# Patient Record
Sex: FEMALE | Race: WHITE | URBAN - METROPOLITAN AREA
[De-identification: names, ages, dates, MRNs, and addresses within clinical notes are randomized per-mention and may not be internally consistent; named-entity substitution may affect disease eponyms.]

---

## 2023-03-02 ENCOUNTER — INPATIENT (INPATIENT)
Facility: HOSPITAL | Age: 23
LOS: 2 days | Discharge: HOME | DRG: 832 | End: 2023-03-05
Attending: PEDIATRICS | Admitting: PEDIATRICS
Payer: COMMERCIAL

## 2023-03-02 ENCOUNTER — APPOINTMENT (OUTPATIENT)
Dept: VASCULAR SURGERY | Facility: CLINIC | Age: 23
End: 2023-03-02
Payer: MEDICAID

## 2023-03-02 VITALS
HEART RATE: 100 BPM | DIASTOLIC BLOOD PRESSURE: 85 MMHG | SYSTOLIC BLOOD PRESSURE: 120 MMHG | OXYGEN SATURATION: 98 % | WEIGHT: 199.96 LBS | RESPIRATION RATE: 18 BRPM | TEMPERATURE: 98 F

## 2023-03-02 DIAGNOSIS — L03.116 CELLULITIS OF LEFT LOWER LIMB: ICD-10-CM

## 2023-03-02 DIAGNOSIS — M79.89 OTHER SPECIFIED SOFT TISSUE DISORDERS: ICD-10-CM

## 2023-03-02 PROBLEM — Z00.00 ENCOUNTER FOR PREVENTIVE HEALTH EXAMINATION: Status: ACTIVE | Noted: 2023-03-02

## 2023-03-02 LAB
ANION GAP SERPL CALC-SCNC: 11 MMOL/L — SIGNIFICANT CHANGE UP (ref 5–17)
BASOPHILS # BLD AUTO: 0.04 K/UL — SIGNIFICANT CHANGE UP (ref 0–0.2)
BASOPHILS NFR BLD AUTO: 0.2 % — SIGNIFICANT CHANGE UP (ref 0–2)
BLD GP AB SCN SERPL QL: NEGATIVE — SIGNIFICANT CHANGE UP
BUN SERPL-MCNC: 4 MG/DL — LOW (ref 7–23)
CALCIUM SERPL-MCNC: 9 MG/DL — SIGNIFICANT CHANGE UP (ref 8.4–10.5)
CHLORIDE SERPL-SCNC: 100 MMOL/L — SIGNIFICANT CHANGE UP (ref 96–108)
CO2 SERPL-SCNC: 24 MMOL/L — SIGNIFICANT CHANGE UP (ref 22–31)
CREAT SERPL-MCNC: 0.44 MG/DL — LOW (ref 0.5–1.3)
EGFR: 140 ML/MIN/1.73M2 — SIGNIFICANT CHANGE UP
EOSINOPHIL # BLD AUTO: 0.12 K/UL — SIGNIFICANT CHANGE UP (ref 0–0.5)
EOSINOPHIL NFR BLD AUTO: 0.7 % — SIGNIFICANT CHANGE UP (ref 0–6)
GLUCOSE SERPL-MCNC: 137 MG/DL — HIGH (ref 70–99)
HCT VFR BLD CALC: 34.7 % — SIGNIFICANT CHANGE UP (ref 34.5–45)
HGB BLD-MCNC: 11.1 G/DL — LOW (ref 11.5–15.5)
IMM GRANULOCYTES NFR BLD AUTO: 0.8 % — SIGNIFICANT CHANGE UP (ref 0–0.9)
LYMPHOCYTES # BLD AUTO: 14.1 % — SIGNIFICANT CHANGE UP (ref 13–44)
LYMPHOCYTES # BLD AUTO: 2.54 K/UL — SIGNIFICANT CHANGE UP (ref 1–3.3)
MCHC RBC-ENTMCNC: 25.2 PG — LOW (ref 27–34)
MCHC RBC-ENTMCNC: 32 GM/DL — SIGNIFICANT CHANGE UP (ref 32–36)
MCV RBC AUTO: 78.9 FL — LOW (ref 80–100)
MONOCYTES # BLD AUTO: 0.64 K/UL — SIGNIFICANT CHANGE UP (ref 0–0.9)
MONOCYTES NFR BLD AUTO: 3.5 % — SIGNIFICANT CHANGE UP (ref 2–14)
NEUTROPHILS # BLD AUTO: 14.55 K/UL — HIGH (ref 1.8–7.4)
NEUTROPHILS NFR BLD AUTO: 80.7 % — HIGH (ref 43–77)
NRBC # BLD: 0 /100 WBCS — SIGNIFICANT CHANGE UP (ref 0–0)
PLATELET # BLD AUTO: 236 K/UL — SIGNIFICANT CHANGE UP (ref 150–400)
POTASSIUM SERPL-MCNC: 3.5 MMOL/L — SIGNIFICANT CHANGE UP (ref 3.5–5.3)
POTASSIUM SERPL-SCNC: 3.5 MMOL/L — SIGNIFICANT CHANGE UP (ref 3.5–5.3)
RBC # BLD: 4.4 M/UL — SIGNIFICANT CHANGE UP (ref 3.8–5.2)
RBC # FLD: 13.4 % — SIGNIFICANT CHANGE UP (ref 10.3–14.5)
RH IG SCN BLD-IMP: POSITIVE — SIGNIFICANT CHANGE UP
RH IG SCN BLD-IMP: POSITIVE — SIGNIFICANT CHANGE UP
SARS-COV-2 RNA SPEC QL NAA+PROBE: NEGATIVE — SIGNIFICANT CHANGE UP
SODIUM SERPL-SCNC: 135 MMOL/L — SIGNIFICANT CHANGE UP (ref 135–145)
WBC # BLD: 18.04 K/UL — HIGH (ref 3.8–10.5)
WBC # FLD AUTO: 18.04 K/UL — HIGH (ref 3.8–10.5)

## 2023-03-02 PROCEDURE — 93971 EXTREMITY STUDY: CPT

## 2023-03-02 PROCEDURE — 99285 EMERGENCY DEPT VISIT HI MDM: CPT

## 2023-03-02 PROCEDURE — 99204 OFFICE O/P NEW MOD 45 MIN: CPT

## 2023-03-02 PROCEDURE — 99283 EMERGENCY DEPT VISIT LOW MDM: CPT

## 2023-03-02 RX ORDER — PIPERACILLIN AND TAZOBACTAM 4; .5 G/20ML; G/20ML
3.38 INJECTION, POWDER, LYOPHILIZED, FOR SOLUTION INTRAVENOUS ONCE
Refills: 0 | Status: COMPLETED | OUTPATIENT
Start: 2023-03-02 | End: 2023-03-02

## 2023-03-02 RX ORDER — PIPERACILLIN AND TAZOBACTAM 4; .5 G/20ML; G/20ML
3.38 INJECTION, POWDER, LYOPHILIZED, FOR SOLUTION INTRAVENOUS EVERY 8 HOURS
Refills: 0 | Status: DISCONTINUED | OUTPATIENT
Start: 2023-03-03 | End: 2023-03-03

## 2023-03-02 RX ORDER — FOLIC ACID 0.8 MG
1 TABLET ORAL DAILY
Refills: 0 | Status: DISCONTINUED | OUTPATIENT
Start: 2023-03-02 | End: 2023-03-05

## 2023-03-02 RX ORDER — VANCOMYCIN HCL 1 G
1250 VIAL (EA) INTRAVENOUS EVERY 12 HOURS
Refills: 0 | Status: DISCONTINUED | OUTPATIENT
Start: 2023-03-03 | End: 2023-03-03

## 2023-03-02 RX ORDER — FERROUS SULFATE 325(65) MG
325 TABLET ORAL DAILY
Refills: 0 | Status: DISCONTINUED | OUTPATIENT
Start: 2023-03-02 | End: 2023-03-05

## 2023-03-02 RX ORDER — VANCOMYCIN HCL 1 G
1250 VIAL (EA) INTRAVENOUS ONCE
Refills: 0 | Status: COMPLETED | OUTPATIENT
Start: 2023-03-02 | End: 2023-03-02

## 2023-03-02 RX ADMIN — PIPERACILLIN AND TAZOBACTAM 200 GRAM(S): 4; .5 INJECTION, POWDER, LYOPHILIZED, FOR SOLUTION INTRAVENOUS at 20:51

## 2023-03-02 RX ADMIN — Medication 166.67 MILLIGRAM(S): at 23:17

## 2023-03-02 NOTE — ED PROVIDER NOTE - PROGRESS NOTE DETAILS
Vascular Vascular consulted w/ ID Dr Pacheco, whom is aware pt is 30 weeks pregnant, and is requesting Zosyn and Vanco OB paged OB again paged D/w OB Dr List - admit to OB Dr Weston, agrees with abx regimen Vascular consulted w/ ID Dr Pacheco, whom is aware pt is 30 weeks pregnant, and is requesting Zosyn and Vanco. vascular will follow pt.

## 2023-03-02 NOTE — ED PROVIDER NOTE - CLINICAL SUMMARY MEDICAL DECISION MAKING FREE TEXT BOX
Pt referred to the ED for admission for IV abx in the setting of 30 weeks pregnant, cellulitis, failing outpt oral abx. Labs, abx, anticipate admission

## 2023-03-02 NOTE — ED PROVIDER NOTE - TOBACCO USE
Discussed no further surgery is recommended due to multiple surgeries. Do not recommend gold weight. Unknown if ever smoked

## 2023-03-02 NOTE — ED PROVIDER NOTE - NS ED ROS FT
Constitutional: No fever or chills.   GI: No nausea, vomiting, diarrhea or abdominal pain.  : No dysuria, frequency or burning. no vaginal bleeding  Skin: See HPI  Endocrine: No history of thyroid disease or diabetes.  Except as documented in the HPI, all other systems are negative.

## 2023-03-02 NOTE — ED PROVIDER NOTE - PHYSICAL EXAMINATION
Constitutional: Well appearing, awake, alert, oriented to person, place, time/situation and in no apparent distress.  ENMT: Airway patent.   Eyes: Clear bilaterally  Cardiac: Normal rate, regular rhythm.   Respiratory: No increased WOB, tachypnea, hypoxia, or accessory mm use. Pt speaks in full sentences.   Musculoskeletal: Range of motion is not limited  Neuro: Alert and oriented x 3, face symmetric and speech fluent. Nml gross motor movement, grossly non focal   Skin: Skin normal color for race, warm, dry and intact. LLE cellulitis to the anterior lower leg, warm. peeling to toes distally - pt reports unrelated and dry skin  Vasc: 2+ pedal pulses  Psych: Alert and oriented to person, place, time/situation. normal mood and affect. no apparent risk to self or others.

## 2023-03-02 NOTE — CONSULT NOTE ADULT - ASSESSMENT
A/P: 23y.o F , EGA 30 weeks w/ no sig PMHx, presents for IV Antibiotics after failed po treatment for cellulitis.    -IV Antibiotics  w/ Vanc/Zosyn (approved by ID->Dr Pacheco)  -keep leg elevated on 2 pillows and wrapped in Kirlex  -Vascular will follow closely

## 2023-03-02 NOTE — ED ADULT NURSE NOTE - NSIMPLEMENTINTERV_GEN_ALL_ED
Implemented All Fall Risk Interventions:  [place] to call system. Call bell, personal items and telephone within reach. Instruct patient to call for assistance. Room bathroom lighting operational. Non-slip footwear when patient is off stretcher. Physically safe environment: no spills, clutter or unnecessary equipment. Stretcher in lowest position, wheels locked, appropriate side rails in place. Provide visual cue, wrist band, yellow gown, etc. Monitor gait and stability. Monitor for mental status changes and reorient to person, place, and time. Review medications for side effects contributing to fall risk. Reinforce activity limits and safety measures with patient and family.

## 2023-03-02 NOTE — ED ADULT NURSE NOTE - OBJECTIVE STATEMENT
24 y/o female c/o left leg/ foot swelling and redness since Monday which has worsened, pt seen by Dr. Hawkins's team and advised to come to ED for IV abx, pt 30 weeks pregnant. pt denies any injury to extremity.

## 2023-03-02 NOTE — ED PROVIDER NOTE - OBJECTIVE STATEMENT
Pt w/ no sig PMHx, is 30 weeks pregnant, presents for cellulitis. She states she was referred to vascular Dr Hawkins. She went to the office, and was referred to the ED for admission and IV abx. She reports the redness began on 2/27, isolated to the anterior ankle. She was started on Clindamycin 450 mg TID w/o relief. She soumya a line around the area affected, and it has since spread up the anterior leg. She has been ruled out for DVT. She has no posterior leg or calf pain. No fevers or chills.   She states she has no pregnancy-related issues. No abd pain, vag bleeding or discharge. [place] in [facility] [place]. Pt w/ no sig PMHx, is 30 weeks pregnant, , presents for cellulitis. She states she was referred to vascular Dr Hawkins. She went to the office, and was referred to the ED for admission and IV abx. She reports the redness began on , isolated to the anterior ankle. She was started on Clindamycin 450 mg TID w/o relief. She soumya a line around the area affected, and it has since spread up the anterior leg. She has been ruled out for DVT yesterday outside the hospital x 2, and once today in the vascular office. She has no posterior leg or calf pain. No fevers or chills.   She states she has no pregnancy-related issues. No abd pain, vag bleeding or discharge. Her OB is in [facility] [place] [place].

## 2023-03-03 LAB
ALBUMIN SERPL ELPH-MCNC: 3.6 G/DL — SIGNIFICANT CHANGE UP (ref 3.3–5)
ALP SERPL-CCNC: 95 U/L — SIGNIFICANT CHANGE UP (ref 40–120)
ALT FLD-CCNC: 9 U/L — LOW (ref 10–45)
AST SERPL-CCNC: 17 U/L — SIGNIFICANT CHANGE UP (ref 10–40)
BASOPHILS # BLD AUTO: 0.03 K/UL — SIGNIFICANT CHANGE UP (ref 0–0.2)
BASOPHILS NFR BLD AUTO: 0.3 % — SIGNIFICANT CHANGE UP (ref 0–2)
BILIRUB DIRECT SERPL-MCNC: <0.2 MG/DL — SIGNIFICANT CHANGE UP (ref 0–0.3)
BILIRUB INDIRECT FLD-MCNC: SIGNIFICANT CHANGE UP MG/DL (ref 0.2–1)
BILIRUB SERPL-MCNC: 0.2 MG/DL — SIGNIFICANT CHANGE UP (ref 0.2–1.2)
BLD GP AB SCN SERPL QL: NEGATIVE — SIGNIFICANT CHANGE UP
COLLECT DURATION TIME UR: 24 HR — SIGNIFICANT CHANGE UP
EOSINOPHIL # BLD AUTO: 0.11 K/UL — SIGNIFICANT CHANGE UP (ref 0–0.5)
EOSINOPHIL NFR BLD AUTO: 0.9 % — SIGNIFICANT CHANGE UP (ref 0–6)
HCT VFR BLD CALC: 34.8 % — SIGNIFICANT CHANGE UP (ref 34.5–45)
HGB BLD-MCNC: 10.9 G/DL — LOW (ref 11.5–15.5)
IMM GRANULOCYTES NFR BLD AUTO: 0.8 % — SIGNIFICANT CHANGE UP (ref 0–0.9)
LYMPHOCYTES # BLD AUTO: 1.62 K/UL — SIGNIFICANT CHANGE UP (ref 1–3.3)
LYMPHOCYTES # BLD AUTO: 13.5 % — SIGNIFICANT CHANGE UP (ref 13–44)
MCHC RBC-ENTMCNC: 25.3 PG — LOW (ref 27–34)
MCHC RBC-ENTMCNC: 31.3 GM/DL — LOW (ref 32–36)
MCV RBC AUTO: 80.7 FL — SIGNIFICANT CHANGE UP (ref 80–100)
MONOCYTES # BLD AUTO: 0.46 K/UL — SIGNIFICANT CHANGE UP (ref 0–0.9)
MONOCYTES NFR BLD AUTO: 3.8 % — SIGNIFICANT CHANGE UP (ref 2–14)
NEUTROPHILS # BLD AUTO: 9.65 K/UL — HIGH (ref 1.8–7.4)
NEUTROPHILS NFR BLD AUTO: 80.7 % — HIGH (ref 43–77)
NRBC # BLD: 0 /100 WBCS — SIGNIFICANT CHANGE UP (ref 0–0)
PLATELET # BLD AUTO: 244 K/UL — SIGNIFICANT CHANGE UP (ref 150–400)
PROT 24H UR-MRATE: 198 MG/24 H — HIGH (ref 50–100)
PROT SERPL-MCNC: 7 G/DL — SIGNIFICANT CHANGE UP (ref 6–8.3)
RBC # BLD: 4.31 M/UL — SIGNIFICANT CHANGE UP (ref 3.8–5.2)
RBC # FLD: 13.5 % — SIGNIFICANT CHANGE UP (ref 10.3–14.5)
T PALLIDUM AB TITR SER: NEGATIVE — SIGNIFICANT CHANGE UP
TOTAL VOLUME - 24 HOUR: 2200 ML — SIGNIFICANT CHANGE UP
URINE CREATININE CALCULATION: 1.3 G/24 H — SIGNIFICANT CHANGE UP (ref 0.8–1.8)
VANCOMYCIN TROUGH SERPL-MCNC: 4 UG/ML — LOW (ref 10–20)
WBC # BLD: 11.97 K/UL — HIGH (ref 3.8–10.5)
WBC # FLD AUTO: 11.97 K/UL — HIGH (ref 3.8–10.5)

## 2023-03-03 PROCEDURE — 99231 SBSQ HOSP IP/OBS SF/LOW 25: CPT

## 2023-03-03 PROCEDURE — 99222 1ST HOSP IP/OBS MODERATE 55: CPT

## 2023-03-03 RX ORDER — DIPHENHYDRAMINE HCL 50 MG
25 CAPSULE ORAL ONCE
Refills: 0 | Status: DISCONTINUED | OUTPATIENT
Start: 2023-03-03 | End: 2023-03-03

## 2023-03-03 RX ORDER — DIPHENHYDRAMINE HCL 50 MG
50 CAPSULE ORAL ONCE
Refills: 0 | Status: COMPLETED | OUTPATIENT
Start: 2023-03-03 | End: 2023-03-03

## 2023-03-03 RX ORDER — HEPARIN SODIUM 5000 [USP'U]/ML
5000 INJECTION INTRAVENOUS; SUBCUTANEOUS EVERY 8 HOURS
Refills: 0 | Status: DISCONTINUED | OUTPATIENT
Start: 2023-03-03 | End: 2023-03-05

## 2023-03-03 RX ORDER — CEFAZOLIN SODIUM 1 G
2000 VIAL (EA) INJECTION EVERY 8 HOURS
Refills: 0 | Status: DISCONTINUED | OUTPATIENT
Start: 2023-03-03 | End: 2023-03-05

## 2023-03-03 RX ORDER — INFLUENZA VIRUS VACCINE 15; 15; 15; 15 UG/.5ML; UG/.5ML; UG/.5ML; UG/.5ML
0.5 SUSPENSION INTRAMUSCULAR ONCE
Refills: 0 | Status: DISCONTINUED | OUTPATIENT
Start: 2023-03-03 | End: 2023-03-05

## 2023-03-03 RX ADMIN — PIPERACILLIN AND TAZOBACTAM 25 GRAM(S): 4; .5 INJECTION, POWDER, LYOPHILIZED, FOR SOLUTION INTRAVENOUS at 01:17

## 2023-03-03 RX ADMIN — Medication 325 MILLIGRAM(S): at 11:36

## 2023-03-03 RX ADMIN — Medication 50 MILLIGRAM(S): at 03:51

## 2023-03-03 RX ADMIN — Medication 1 MILLIGRAM(S): at 11:36

## 2023-03-03 RX ADMIN — Medication 166.67 MILLIGRAM(S): at 11:57

## 2023-03-03 RX ADMIN — Medication 1 TABLET(S): at 11:36

## 2023-03-03 RX ADMIN — Medication 50 MILLIGRAM(S): at 11:36

## 2023-03-03 RX ADMIN — Medication 100 MILLIGRAM(S): at 15:27

## 2023-03-03 RX ADMIN — PIPERACILLIN AND TAZOBACTAM 25 GRAM(S): 4; .5 INJECTION, POWDER, LYOPHILIZED, FOR SOLUTION INTRAVENOUS at 09:21

## 2023-03-03 RX ADMIN — Medication 100 MILLIGRAM(S): at 21:17

## 2023-03-03 RX ADMIN — HEPARIN SODIUM 5000 UNIT(S): 5000 INJECTION INTRAVENOUS; SUBCUTANEOUS at 21:16

## 2023-03-03 NOTE — PATIENT PROFILE OB - FALL HARM RISK - HARM RISK INTERVENTIONS

## 2023-03-03 NOTE — CONSULT NOTE ADULT - SUBJECTIVE AND OBJECTIVE BOX
Vascular Attending:  Shruthi      HPI:  Pt  is 23y.o F , EGA 30 weeks w/ no sig PMHx, presents for IV Antibiotics after failed po treatment for cellulitis. Pt  reports the redness began on , isolated to the anterior ankle. She was started on Clindamycin 450 mg TID w/o relief. She soumya a line around the area affected, and it has since spread up the anterior leg. She has been ruled out for DVT yesterday outside the hospital x 2, and once today in the vascular office. She has no posterior leg or calf pain. No fevers or chills. She states she has no pregnancy-related issues. No abd pain, vag bleeding or discharge. [place] in [facility] [place]      PAST MEDICAL & SURGICAL HISTORY:      REVIEW OF SYSTEMS  Negative except as in HPI    Allergic/Immunologic:	    MEDICATIONS  (STANDING):  ferrous    sulfate 325 milliGRAM(s) Oral daily  folic acid 1 milliGRAM(s) Oral daily  piperacillin/tazobactam IVPB.. 3.375 Gram(s) IV Intermittent every 8 hours  prenatal multivitamin 1 Tablet(s) Oral daily  vancomycin  IVPB 1250 milliGRAM(s) IV Intermittent every 12 hours    MEDICATIONS  (PRN):    Allergies  No Known Allergies    Intolerances    Vital Signs Last 24 Hrs  T(C): 36.8 (02 Mar 2023 17:59), Max: 36.8 (02 Mar 2023 17:59)  T(F): 98.3 (02 Mar 2023 17:59), Max: 98.3 (02 Mar 2023 17:59)  HR: 100 (02 Mar 2023 17:59) (100 - 100)  BP: 120/85 (02 Mar 2023 17:59) (120/85 - 120/85)  BP(mean): --  RR: 18 (02 Mar 2023 17:59) (18 - 18)  SpO2: 98% (02 Mar 2023 17:59) (98% - 98%)    Parameters below as of 02 Mar 2023 17:59  Patient On (Oxygen Delivery Method): room air    PHYSICAL EXAM:    Constitutional:  Pt AXOX3 in NAD    Eyes: PERRL    Respiratory: Nonlabored    Cardiovascular: s1s2    Gastrointestinal: Abd gravid    Extremities: LLE erythema to anterior ankle no open wounds    Vascular: 2+    Neurological: intact    LABS:                        11.1   18.04 )-----------( 236      ( 02 Mar 2023 20:05 )             34.7     03-02    135  |  100  |  4<L>  ----------------------------<  137<H>  3.5   |  24  |  0.44<L>    Ca    9.0      02 Mar 2023 20:05    TPro  7.0  /  Alb  3.6  /  TBili  0.2  /  DBili  <0.2  /  AST  17  /  ALT  9<L>  /  AlkPhos  95  02      
HPI:    22 y/o female, now 30 weeks pregnant with no sig PMHx, presents for IV Antibiotics after failed po treatment for cellulitis. Pt  reports the redness began on 2/27, isolated to the anterior ankle. She was started on Clindamycin 450 mg TID.   She took 8 doses without relief.  She soumya a line around the area affected, and it has since spread up the anterior leg. She has been ruled out for DVT yesterday outside the hospital x 2, and once today in the vascular office. She has no posterior leg or calf pain. No fevers or chills. She states she has no pregnancy-related issues. No abd pain, vag bleeding or discharge. Her [place] is in [facility] [place]    She was admitted to [facility] and started on Vancomycin and Zosyn.  She reports the redness has improved significantly     PAST MEDICAL & SURGICAL HISTORY:        REVIEW OF SYSTEMS:    General:	 no weakness; no fevers, no chills  Skin/Breast: no rash  Respiratory and Thorax: no SOB, no cough  Cardiovascular:	No chest pain  Gastrointestinal:	 no nausea, vomiting , diarrhea  Genitourinary:	no dysuria, no difficulty urinating, no hematuria  Musculoskeletal:	no weakness, no joint swelling/pain  Neurological:	no focal weakness/numbness  Endocrine:	no polyuria, no polydipsia      ANTIBIOTICS:  MEDICATIONS  (STANDING):  ferrous    sulfate 325 milliGRAM(s) Oral daily  folic acid 1 milliGRAM(s) Oral daily  influenza   Vaccine 0.5 milliLiter(s) IntraMuscular once  piperacillin/tazobactam IVPB.. 3.375 Gram(s) IV Intermittent every 8 hours  prenatal multivitamin 1 Tablet(s) Oral daily  vancomycin  IVPB 1250 milliGRAM(s) IV Intermittent every 12 hours    MEDICATIONS  (PRN):      Allergies    No Known Allergies    Intolerances        SOCIAL HISTORY:    FAMILY HISTORY:      Vital Signs Last 24 Hrs  T(C): 37 (03 Mar 2023 10:00), Max: 37 (03 Mar 2023 10:00)  T(F): 98.6 (03 Mar 2023 10:00), Max: 98.6 (03 Mar 2023 10:00)  HR: 92 (03 Mar 2023 10:00) (88 - 119)  BP: 132/72 (03 Mar 2023 10:00) (114/74 - 140/90)  BP(mean): --  RR: 18 (03 Mar 2023 10:00) (18 - 19)  SpO2: 100% (03 Mar 2023 10:00) (97% - 100%)    Parameters below as of 03 Mar 2023 10:00  Patient On (Oxygen Delivery Method): room air        PHYSICAL EXAM:  Constitutional:  non-toxic, no distress  Eyes:  no icterus   Ear/Nose/Throat: no oral lesion	  Neck:  supple  Respiratory: CTA keisha  Cardiovascular: S1S2 RRR, no murmurs  Gastrointestinal:soft, (+) BS, no HSM  Extremities: left lower extremity erythema around ankle, mild tenderness to touch, no purulence or drainage   Vascular: DP Pulse:	right normal; left normal            LABS:                        10.9   11.97 )-----------( 244      ( 03 Mar 2023 11:49 )             34.8     03-02    135  |  100  |  4<L>  ----------------------------<  137<H>  3.5   |  24  |  0.44<L>    Ca    9.0      02 Mar 2023 20:05    TPro  7.0  /  Alb  3.6  /  TBili  0.2  /  DBili  <0.2  /  AST  17  /  ALT  9<L>  /  AlkPhos  95  03-02          MICROBIOLOGY:    Culture - Blood (03.02.23 @ 23:12)    Specimen Source: .Blood Blood-Peripheral    Culture Results:   No growth at 12 hours      RADIOLOGY & ADDITIONAL STUDIES:

## 2023-03-03 NOTE — CONSULT NOTE ADULT - ASSESSMENT
IMPRESSION:  Non-purulent cellulitis of the LLE.  Most likely causative agents are Staph aureus and Group A strep. She failed clindamycin however 30 % of all staph aureus isolates (both MRSA and MSSA) have clindamycin resistance and 53 % of Group A strep isolates are resistant to clindamycin.  It's likely that the pathogen here is Clindamycin-resistant.      Treating empirically for MRSA would be challenging here as we can't give her doxycycline or bactrim (due to pregnancy) and she has failed clindamycin.  Linezolid would be the only remaining option however it is category C with limited data to support it's safety in pregnancy.  She does not have risk factors for MRSA and has a non-purulent cellulitis therefore I believe we can attempt to avoid MRSA coverage.    Recommend:  1.  Stop Vancomycin and Zosyn  2.  Start Cefazolin 2 grams IV q8hrs (give first dose 6 hrs after last zosyn).  This will target Group A strep and MSSA  3.  Monitor her for 24 hrs on IV Cefazolin. If she clinically improves on cefazolin, ok to discharge her on Keflex 500 mg PO q6hrs to complete 7 days course (ends 3/9/2023).  If she worsens on Cefazolin, Vancomycin may need to be restarted    ID team 2 will follow. Dr. Pacheco will cover me tonight and this weekend. I will return on 3/6/23

## 2023-03-03 NOTE — PHYSICAL THERAPY INITIAL EVALUATION ADULT - MODALITIES TREATMENT COMMENTS
Pt. is self limiting ambulation to room perimeter/bathroom and utilizes self-elected NWB status for LLE. Pt. has been instructed in gentle ROM knee/ankle as tolerated to ensure ROM maintenance while being treated for acute cellulitis of LLE.

## 2023-03-03 NOTE — PHYSICAL THERAPY INITIAL EVALUATION ADULT - PREDICTED DURATION OF THERAPY (DAYS/WKS), PT EVAL
Pt remains modified independent with all functional mobility and does not require further PT f/u at this time.

## 2023-03-03 NOTE — PHYSICAL THERAPY INITIAL EVALUATION ADULT - PERTINENT HX OF CURRENT PROBLEM, REHAB EVAL
Pt. is a 23 y.o female at 29 weeks gestation, referred for PT with difficulty ambulating/weight-bearing on LLE i/so cellulitis. Pt is currently admitted for IV antibiotics for LE cellulites refractory to out-patient PO abx management.

## 2023-03-04 LAB
ANION GAP SERPL CALC-SCNC: 9 MMOL/L — SIGNIFICANT CHANGE UP (ref 5–17)
BUN SERPL-MCNC: 3 MG/DL — LOW (ref 7–23)
CALCIUM SERPL-MCNC: 8.3 MG/DL — LOW (ref 8.4–10.5)
CHLORIDE SERPL-SCNC: 103 MMOL/L — SIGNIFICANT CHANGE UP (ref 96–108)
CO2 SERPL-SCNC: 24 MMOL/L — SIGNIFICANT CHANGE UP (ref 22–31)
CREAT SERPL-MCNC: 0.46 MG/DL — LOW (ref 0.5–1.3)
EGFR: 139 ML/MIN/1.73M2 — SIGNIFICANT CHANGE UP
GLUCOSE 1H P GLC SERPL-MCNC: 129 MG/DL — SIGNIFICANT CHANGE UP (ref 70–199)
GLUCOSE SERPL-MCNC: 82 MG/DL — SIGNIFICANT CHANGE UP (ref 70–99)
HCT VFR BLD CALC: 33 % — LOW (ref 34.5–45)
HGB BLD-MCNC: 10.3 G/DL — LOW (ref 11.5–15.5)
MAGNESIUM SERPL-MCNC: 1.7 MG/DL — SIGNIFICANT CHANGE UP (ref 1.6–2.6)
MCHC RBC-ENTMCNC: 25.2 PG — LOW (ref 27–34)
MCHC RBC-ENTMCNC: 31.2 GM/DL — LOW (ref 32–36)
MCV RBC AUTO: 80.7 FL — SIGNIFICANT CHANGE UP (ref 80–100)
NRBC # BLD: 0 /100 WBCS — SIGNIFICANT CHANGE UP (ref 0–0)
PHOSPHATE SERPL-MCNC: 3.5 MG/DL — SIGNIFICANT CHANGE UP (ref 2.5–4.5)
PLATELET # BLD AUTO: 216 K/UL — SIGNIFICANT CHANGE UP (ref 150–400)
POTASSIUM SERPL-MCNC: 3.8 MMOL/L — SIGNIFICANT CHANGE UP (ref 3.5–5.3)
POTASSIUM SERPL-SCNC: 3.8 MMOL/L — SIGNIFICANT CHANGE UP (ref 3.5–5.3)
RBC # BLD: 4.09 M/UL — SIGNIFICANT CHANGE UP (ref 3.8–5.2)
RBC # FLD: 13.5 % — SIGNIFICANT CHANGE UP (ref 10.3–14.5)
SODIUM SERPL-SCNC: 136 MMOL/L — SIGNIFICANT CHANGE UP (ref 135–145)
WBC # BLD: 12.17 K/UL — HIGH (ref 3.8–10.5)
WBC # FLD AUTO: 12.17 K/UL — HIGH (ref 3.8–10.5)

## 2023-03-04 PROCEDURE — 99233 SBSQ HOSP IP/OBS HIGH 50: CPT | Mod: GC

## 2023-03-04 PROCEDURE — 99231 SBSQ HOSP IP/OBS SF/LOW 25: CPT

## 2023-03-04 RX ORDER — DEXTROSE 50 % IN WATER 50 %
50 SYRINGE (ML) INTRAVENOUS ONCE
Refills: 0 | Status: COMPLETED | OUTPATIENT
Start: 2023-03-04 | End: 2023-03-04

## 2023-03-04 RX ADMIN — Medication 50 GRAM(S): at 07:08

## 2023-03-04 RX ADMIN — HEPARIN SODIUM 5000 UNIT(S): 5000 INJECTION INTRAVENOUS; SUBCUTANEOUS at 06:02

## 2023-03-04 RX ADMIN — Medication 1 MILLIGRAM(S): at 11:27

## 2023-03-04 RX ADMIN — Medication 100 MILLIGRAM(S): at 22:14

## 2023-03-04 RX ADMIN — Medication 100 MILLIGRAM(S): at 06:02

## 2023-03-04 RX ADMIN — HEPARIN SODIUM 5000 UNIT(S): 5000 INJECTION INTRAVENOUS; SUBCUTANEOUS at 13:45

## 2023-03-04 RX ADMIN — Medication 1 TABLET(S): at 11:27

## 2023-03-04 RX ADMIN — Medication 325 MILLIGRAM(S): at 11:26

## 2023-03-04 RX ADMIN — Medication 100 MILLIGRAM(S): at 13:44

## 2023-03-04 RX ADMIN — HEPARIN SODIUM 5000 UNIT(S): 5000 INJECTION INTRAVENOUS; SUBCUTANEOUS at 22:15

## 2023-03-04 NOTE — PROGRESS NOTE ADULT - ASSESSMENT
23F h/o 33wk pregnant p/w nonpurulent cellulitis of LLE.  Significantly improved on IV abx.      - ok to discharge her with Cephalexin 500mg PO q6h   - duration of abx is total 7 days (3/2 - 3/9)    Thank you for your consult.  Please re-consult us or call us with questions.  Case d/w primary team.    Tasneem Pacheco MD, MS  Infectious Disease attending  work cell [phone]  For any questions during evening/weekend/holiday, please page ID on call

## 2023-03-05 ENCOUNTER — TRANSCRIPTION ENCOUNTER (OUTPATIENT)
Age: 23
End: 2023-03-05

## 2023-03-05 VITALS
TEMPERATURE: 98 F | HEART RATE: 105 BPM | SYSTOLIC BLOOD PRESSURE: 127 MMHG | RESPIRATION RATE: 17 BRPM | DIASTOLIC BLOOD PRESSURE: 80 MMHG | OXYGEN SATURATION: 100 %

## 2023-03-05 LAB
CULTURE RESULTS: SIGNIFICANT CHANGE UP
SPECIMEN SOURCE: SIGNIFICANT CHANGE UP

## 2023-03-05 PROCEDURE — 80076 HEPATIC FUNCTION PANEL: CPT

## 2023-03-05 PROCEDURE — 86780 TREPONEMA PALLIDUM: CPT

## 2023-03-05 PROCEDURE — 99238 HOSP IP/OBS DSCHRG MGMT 30/<: CPT

## 2023-03-05 PROCEDURE — 87635 SARS-COV-2 COVID-19 AMP PRB: CPT

## 2023-03-05 PROCEDURE — 84100 ASSAY OF PHOSPHORUS: CPT

## 2023-03-05 PROCEDURE — 87081 CULTURE SCREEN ONLY: CPT

## 2023-03-05 PROCEDURE — 84156 ASSAY OF PROTEIN URINE: CPT

## 2023-03-05 PROCEDURE — 97161 PT EVAL LOW COMPLEX 20 MIN: CPT

## 2023-03-05 PROCEDURE — 85025 COMPLETE CBC W/AUTO DIFF WBC: CPT

## 2023-03-05 PROCEDURE — 86901 BLOOD TYPING SEROLOGIC RH(D): CPT

## 2023-03-05 PROCEDURE — 86850 RBC ANTIBODY SCREEN: CPT

## 2023-03-05 PROCEDURE — 80048 BASIC METABOLIC PNL TOTAL CA: CPT

## 2023-03-05 PROCEDURE — 85027 COMPLETE CBC AUTOMATED: CPT

## 2023-03-05 PROCEDURE — 99285 EMERGENCY DEPT VISIT HI MDM: CPT | Mod: 25

## 2023-03-05 PROCEDURE — 86900 BLOOD TYPING SEROLOGIC ABO: CPT

## 2023-03-05 PROCEDURE — 36415 COLL VENOUS BLD VENIPUNCTURE: CPT

## 2023-03-05 PROCEDURE — 82950 GLUCOSE TEST: CPT

## 2023-03-05 PROCEDURE — 80202 ASSAY OF VANCOMYCIN: CPT

## 2023-03-05 PROCEDURE — 83735 ASSAY OF MAGNESIUM: CPT

## 2023-03-05 PROCEDURE — 87040 BLOOD CULTURE FOR BACTERIA: CPT

## 2023-03-05 RX ORDER — CEPHALEXIN 500 MG
1 CAPSULE ORAL
Qty: 44 | Refills: 0
Start: 2023-03-05 | End: 2023-03-15

## 2023-03-05 RX ORDER — CEPHALEXIN 500 MG
1 CAPSULE ORAL
Qty: 16 | Refills: 0
Start: 2023-03-05 | End: 2023-03-08

## 2023-03-05 RX ADMIN — HEPARIN SODIUM 5000 UNIT(S): 5000 INJECTION INTRAVENOUS; SUBCUTANEOUS at 05:57

## 2023-03-05 RX ADMIN — Medication 100 MILLIGRAM(S): at 05:57

## 2023-03-05 NOTE — PROGRESS NOTE ADULT - SUBJECTIVE AND OBJECTIVE BOX
Subjective: Patient reports LLE pain, redness, and swelling have significantly improved compared to yesterday. No other acute complaints.     ROS:   Denies Headache, blurred vision, Chest Pain, SOB, Abdominal pain, nausea or vomiting     Social   ceFAZolin   IVPB 2000  ceFAZolin   IVPB 2000  heparin   Injectable 5000      Allergies    No Known Allergies    Intolerances        Vital Signs Last 24 Hrs  T(C): 37 (04 Mar 2023 06:00), Max: 37 (03 Mar 2023 10:00)  T(F): 98.6 (04 Mar 2023 06:00), Max: 98.6 (03 Mar 2023 10:00)  HR: 98 (04 Mar 2023 06:00) (92 - 102)  BP: 104/59 (04 Mar 2023 06:00) (104/59 - 132/72)  BP(mean): --  RR: 18 (04 Mar 2023 06:00) (16 - 18)  SpO2: 99% (04 Mar 2023 06:00) (96% - 100%)    Parameters below as of 04 Mar 2023 06:00  Patient On (Oxygen Delivery Method): room air      I&O's Summary      Physical Exam:  Constitutional:  NAD, resting comfortably  Respiratory: Nonlabored  Cardiovascular: s1s2  Gastrointestinal: Abd gravid  Extremities: LLE erythema to anterior ankle without open wounds, erythema and edema have significantly improved compared to previous exam  Vascular: 2+ pal throughout  Neuro: A&Ox4        LABS:                        10.3   12.17 )-----------( 216      ( 04 Mar 2023 06:17 )             33.0     03-04    136  |  103  |  3<L>  ----------------------------<  82  3.8   |  24  |  0.46<L>    Ca    8.3<L>      04 Mar 2023 06:17  Phos  3.5     03-04  Mg     1.7     03-04    TPro  7.0  /  Alb  3.6  /  TBili  0.2  /  DBili  <0.2  /  AST  17  /  ALT  9<L>  /  AlkPhos  95  03-02        A/P: 24 y/o F , EGA 30 weeks w/ no sig PMHx, presents for IV Antibiotics after failed po treatment for cellulitis. LLE cellulitis improving.     -Continue Ancef 2g q8h  -keep leg elevated on 2 pillows and wrapped in Kerlix  -Vascular will follow closely call [phone] with any questions or concerns  
Subjective: Patient seen and examined at bedside. No complaints.    ROS:   Denies Headache, blurred vision, Chest Pain, SOB, Abdominal pain, nausea or vomiting     Social   piperacillin/tazobactam IVPB.. 3.375  vancomycin  IVPB 1250  piperacillin/tazobactam IVPB.. 3.375  vancomycin  IVPB 1250      Allergies    No Known Allergies    Intolerances        Vital Signs Last 24 Hrs  T(C): 36.4 (03 Mar 2023 06:15), Max: 36.8 (02 Mar 2023 17:59)  T(F): 97.6 (03 Mar 2023 06:15), Max: 98.3 (02 Mar 2023 17:59)  HR: 88 (03 Mar 2023 06:15) (88 - 119)  BP: 114/74 (03 Mar 2023 06:15) (114/74 - 140/90)  BP(mean): --  RR: 18 (03 Mar 2023 06:15) (18 - 19)  SpO2: 99% (03 Mar 2023 06:15) (97% - 99%)    Parameters below as of 03 Mar 2023 06:15  Patient On (Oxygen Delivery Method): room air      I&O's Summary      Physical Exam:  Constitutional:  Pt AXOX4 in NAD    Respiratory: Nonlabored    Cardiovascular: s1s2    Gastrointestinal: Abd gravid    Extremities: LLE erythema to anterior ankle no open wounds erythema has improved    Vascular: 2+ pal throughout    Neurological: intact    LABS:                        11.1   18.04 )-----------( 236      ( 02 Mar 2023 20:05 )             34.7     03-    135  |  100  |  4<L>  ----------------------------<  137<H>  3.5   |  24  |  0.44<L>    Ca    9.0      02 Mar 2023 20:05    TPro  7.0  /  Alb  3.6  /  TBili  0.2  /  DBili  <0.2  /  AST  17  /  ALT  9<L>  /  AlkPhos  95  03-02      A/P: 23y.o F , EGA 30 weeks w/ no sig PMHx, presents for IV Antibiotics after failed po treatment for cellulitis.    -IV Antibiotics w/ Vanc/Zosyn (approved by ID->Dr Pacheco)  -keep leg elevated on 2 pillows and wrapped in Kerlix  -Vascular will follow closely call [phone] with any questions or concerns
INFECTIOUS DISEASES CONSULT FOLLOW-UP NOTE    INTERVAL HPI/OVERNIGHT EVENTS:  No event overnight  patient feels well, thinks leg erythma almost resolved     ROS:   Constitutional, eyes, ENT, cardiovascular, respiratory, gastrointestinal, genitourinary, integumentary, neurological, psychiatric and heme/lymph are otherwise negative other than noted above       ANTIBIOTICS/RELEVANT:    MEDICATIONS  (STANDING):  ceFAZolin   IVPB 2000 milliGRAM(s) IV Intermittent every 8 hours  ferrous    sulfate 325 milliGRAM(s) Oral daily  folic acid 1 milliGRAM(s) Oral daily  heparin   Injectable 5000 Unit(s) SubCutaneous every 8 hours  influenza   Vaccine 0.5 milliLiter(s) IntraMuscular once  prenatal multivitamin 1 Tablet(s) Oral daily    MEDICATIONS  (PRN):        Vital Signs Last 24 Hrs  T(C): 37.1 (04 Mar 2023 18:00), Max: 37.1 (04 Mar 2023 10:00)  T(F): 98.8 (04 Mar 2023 18:00), Max: 98.8 (04 Mar 2023 10:00)  HR: 102 (04 Mar 2023 18:00) (94 - 102)  BP: 117/68 (04 Mar 2023 18:00) (104/59 - 126/74)  BP(mean): --  RR: 17 (04 Mar 2023 18:00) (16 - 18)  SpO2: 100% (04 Mar 2023 18:00) (96% - 100%)    Parameters below as of 04 Mar 2023 18:00  Patient On (Oxygen Delivery Method): room air        PHYSICAL EXAM:  Constitutional: alert, NAD  Eyes: the sclera and conjunctiva were normal.   ENT: the ears and nose were normal in appearance.   Neck: the appearance of the neck was normal and the neck was supple.   Pulmonary: no respiratory distress and lungs were clear to auscultation bilaterally.   Heart: heart rate was normal and rhythm regular, normal S1 and S2  Vascular:. there was no peripheral edema  Abdomen: normal bowel sounds, soft,  abdomen  Ext: L leg with mild erythema, significantly improved per patient         LABS:                        10.3   12.17 )-----------( 216      ( 04 Mar 2023 06:17 )             33.0     03-04    136  |  103  |  3<L>  ----------------------------<  82  3.8   |  24  |  0.46<L>    Ca    8.3<L>      04 Mar 2023 06:17  Phos  3.5     03-04  Mg     1.7     03-04            MICROBIOLOGY:      RADIOLOGY & ADDITIONAL STUDIES:  Reviewed
Subjective: Pain significantly improved. No new complaints.    ROS:   Denies Headache, blurred vision, Chest Pain, SOB, Abdominal pain, nausea or vomiting     Social   ceFAZolin   IVPB 2000  ceFAZolin   IVPB 2000  heparin   Injectable 5000      Allergies    No Known Allergies    Intolerances        Vital Signs Last 24 Hrs  T(C): 36.7 (05 Mar 2023 10:00), Max: 37.1 (04 Mar 2023 18:00)  T(F): 98 (05 Mar 2023 10:00), Max: 98.8 (04 Mar 2023 18:00)  HR: 105 (05 Mar 2023 10:00) (89 - 105)  BP: 127/80 (05 Mar 2023 10:00) (111/72 - 129/74)  BP(mean): --  RR: 17 (05 Mar 2023 10:00) (16 - 18)  SpO2: 100% (05 Mar 2023 10:00) (99% - 100%)    Parameters below as of 05 Mar 2023 10:00  Patient On (Oxygen Delivery Method): room air      I&O's Summary      Physical Exam:  Constitutional:  NAD, resting comfortably  Respiratory: Nonlabored  Cardiovascular: rrr  Gastrointestinal: Abd gravid  Extremities: LLE erythema on anterior ankle within existing marking. No open wounds. Per chief resident tenderness, erythema and edema have significantly improved compared to previous exam  Vascular: 2+ pal throughout  Neuro: A&Ox4      LABS:                        10.3   12.17 )-----------( 216      ( 04 Mar 2023 06:17 )             33.0     03-04    136  |  103  |  3<L>  ----------------------------<  82  3.8   |  24  |  0.46<L>    Ca    8.3<L>      04 Mar 2023 06:17  Phos  3.5     03-04  Mg     1.7     03-04

## 2023-03-05 NOTE — DISCHARGE NOTE ANTEPARTUM - CARE PROVIDER_API CALL
Sariah Salinas)  Gynecologic Oncology  [address] [place] [address]  Phone: [phone]  Fax: [phone]  Follow Up Time:

## 2023-03-05 NOTE — DISCHARGE NOTE ANTEPARTUM - CARE PLAN
Principal Discharge DX:	Cellulitis  Assessment and plan of treatment:	Please take your full course of antibiotics and follow up with your doctor to ensure infection has fully healed.  Secondary Diagnosis:	Second trimester pregnancy   1

## 2023-03-05 NOTE — DISCHARGE NOTE ANTEPARTUM - PATIENT PORTAL LINK FT
You can access the FollowMyHealth Patient Portal offered by [facility] by registering at the following website: [email or web address]. By joining [facility]’s FollowMyHealth portal, you will also be able to view your health information using other applications (apps) compatible with our system.

## 2023-03-05 NOTE — DISCHARGE NOTE ANTEPARTUM - MEDICATION SUMMARY - MEDICATIONS TO TAKE
I will START or STAY ON the medications listed below when I get home from the hospital:    cephalexin 500 mg oral tablet  -- 1 tab(s) by mouth every 6 hours   -- Finish all this medication unless otherwise directed by prescriber.    -- Indication: For Cellulitis    Prenatal Multivitamins with Folic Acid 1 mg oral tablet  -- 1 tab(s) by mouth once a day  -- Indication: For prenatal

## 2023-03-05 NOTE — DISCHARGE NOTE ANTEPARTUM - PLAN OF CARE
Please take your full course of antibiotics and follow up with your doctor to ensure infection has fully healed.

## 2023-03-05 NOTE — DISCHARGE NOTE ANTEPARTUM - HOSPITAL COURSE
Patient was admitted at 28w gestation for management of left lower extremity cellulitis. She was treated with IV antibiotics for 3 days with improvement in her symptoms.

## 2023-03-05 NOTE — PROGRESS NOTE ADULT - ASSESSMENT
A/P: 22 y/o F , EGA 30 weeks w/ no sig PMHx, presents for IV Antibiotics after failed po treatment for cellulitis. LLE cellulitis improving.     -Recommend dc w 14d keflex   -keep leg elevated on 2 pillows and wrapped in Kerlix  -Vascular will follow closely call [phone] with any questions or concerns

## 2023-03-07 PROBLEM — M79.89 LEFT LEG SWELLING: Status: ACTIVE | Noted: 2023-03-07

## 2023-03-07 PROBLEM — L03.116 CELLULITIS OF LEFT LOWER EXTREMITY: Status: ACTIVE | Noted: 2023-03-07

## 2023-03-07 RX ORDER — CEPHALEXIN 500 MG/1
500 CAPSULE ORAL
Qty: 21 | Refills: 0 | Status: ACTIVE | COMMUNITY
Start: 2023-02-28

## 2023-03-07 RX ORDER — AMOXICILLIN 500 MG/1
500 TABLET, FILM COATED ORAL
Qty: 21 | Refills: 0 | Status: ACTIVE | COMMUNITY
Start: 2023-01-23

## 2023-03-07 RX ORDER — CLINDAMYCIN HYDROCHLORIDE 150 MG/1
150 CAPSULE ORAL
Qty: 90 | Refills: 0 | Status: ACTIVE | COMMUNITY
Start: 2023-02-28

## 2023-03-07 NOTE — PROCEDURE
[FreeTextEntry1] : LLE venous duplex ordered to r/o dvt/svt, shows: negative dvt. focal very small svt in the anterior ankle.

## 2023-03-07 NOTE — PHYSICAL EXAM
[Respiratory Effort] : normal respiratory effort [Normal Rate and Rhythm] : normal rate and rhythm [2+] : left 2+ [Ankle Swelling (On Exam)] : present [Ankle Swelling Bilaterally] : severe [Ankle Swelling On The Left] : of the left ankle [Varicose Veins Of Lower Extremities] : not present [] : not present [Alert] : alert [Oriented to Person] : oriented to person [Oriented to Place] : oriented to place [Oriented to Time] : oriented to time [Calm] : calm [de-identified] : WN/WD [FreeTextEntry1] : L anterior ankle round red indurated area, tender to touch with redness spreading up the shin. NO wounds, or ulcers. Area slightly warm to touch. [de-identified] : FROM, L ankle/foot limited ROM given swelling and pain [de-identified] : See cardiovascular section

## 2023-03-07 NOTE — ASSESSMENT
[FreeTextEntry1] : 24 y/o F w/ LLE cellulitis (possible lymphangitis) not responding to Clindamycin and pt 30 wks pregnant. L ankle and foot severely swollen w/ limited weight bearing and ROM. Pt also has a small, focal svt on anterior ankle but does not explain the degree of tenderness, swelling and infectious presentation. \par After long discussion with pt, pt's  and consultation with Dr Hawkins, pt to be admitted for IV antibiotics and leg elevation. Risk, complications and alternatives were discussed, all questions were answered. Patient agrees with plan. [Arterial/Venous Disease] : arterial/venous disease [Medication Management] : medication management

## 2023-03-07 NOTE — HISTORY OF PRESENT ILLNESS
[FreeTextEntry1] : 24 y/o F referred by [facility] for LLE swelling and infection. No other significant PMHx. She is a mother of 3 and current 30 weeks pregnant. She is here with the  and they explain last Friday, she started feeling some discomfort on the left foot. She did not think much about it given she had not hit it, there was no swelling and the skin was normal. On Sunday, she started developing on redness on the anterior aspect of the ankle. The  put a [place] around the area and on Monday it was bigger and spreading up the shin. She reached out to her medical doctor and clindamycin was prescribed. She explains since then, there has been no improvement. The ankle and foot and now severely swollen and she cannot put weight on ti. Also, she reports some numbness and tingling sensation on the foot. They got very concerned there is something wrong with the circulation. She denies any trauma to the area, bug bites, skin breakdown, large varicose veins, previous hx of DVT/SVT. She has been active given she takes care of her kids. \par

## 2023-03-08 LAB
CULTURE RESULTS: SIGNIFICANT CHANGE UP
CULTURE RESULTS: SIGNIFICANT CHANGE UP
SPECIMEN SOURCE: SIGNIFICANT CHANGE UP
SPECIMEN SOURCE: SIGNIFICANT CHANGE UP

## 2023-03-09 DIAGNOSIS — L03.116 CELLULITIS OF LEFT LOWER LIMB: ICD-10-CM

## 2023-03-09 DIAGNOSIS — Z16.29 RESISTANCE TO OTHER SINGLE SPECIFIED ANTIBIOTIC: ICD-10-CM

## 2023-03-09 DIAGNOSIS — Z20.822 CONTACT WITH AND (SUSPECTED) EXPOSURE TO COVID-19: ICD-10-CM

## 2023-03-09 DIAGNOSIS — O99.713 DISEASES OF THE SKIN AND SUBCUTANEOUS TISSUE COMPLICATING PREGNANCY, THIRD TRIMESTER: ICD-10-CM

## 2023-03-09 DIAGNOSIS — B95.61 METHICILLIN SUSCEPTIBLE STAPHYLOCOCCUS AUREUS INFECTION AS THE CAUSE OF DISEASES CLASSIFIED ELSEWHERE: ICD-10-CM

## 2023-03-09 DIAGNOSIS — Z3A.30 30 WEEKS GESTATION OF PREGNANCY: ICD-10-CM

## 2023-03-09 DIAGNOSIS — B95.0 STREPTOCOCCUS, GROUP A, AS THE CAUSE OF DISEASES CLASSIFIED ELSEWHERE: ICD-10-CM

## 2023-05-31 ENCOUNTER — TRANSCRIPTION ENCOUNTER (OUTPATIENT)
Age: 23
End: 2023-05-31

## 2023-09-18 NOTE — PHYSICAL THERAPY INITIAL EVALUATION ADULT - WEIGHT-BEARING RESTRICTIONS: STAND/SIT, REHAB EVAL
Spoke to patient and she says she feels better after drinking cranberry juice and taking azo. Also stated she's taking leftover antibiotics from a dentist appointment that has been helping, thinks she got rid of the yeast but still feels like something is there.put in an order for an urine culture to have done at the labs and pt accepted, and had no further questions.  
LLE; self elected/nonweight-bearing